# Patient Record
(demographics unavailable — no encounter records)

---

## 2025-01-29 NOTE — HISTORY OF PRESENT ILLNESS
[de-identified] : fever [FreeTextEntry6] : 3 yo M with PMH of eczema, otherwise healthy and growing appropriately, here for first visit.  1 week ago vomited once. Next day fever started and then had another episode of emesis following day. Was seen by a doctor with biologic mother and told he likely has norovirus. Went to  on 1/23/25 with fever when Foster Mother took over. Negative there for Flu and Covid. FLu panel result unknown still. Foster mother informed that dogs in previous home had giardia and coccidiosis so brought to [facility] on 1/27/25. Pt's symptoms did not match presentation expected for these parasites; no profuse large volume foul smelling diarrhea and child was well hydrated. No testing done in ER.   Presents today with continued fevers. Appetite down. Drinking well and wetting diapers. Foster Mom noticed pulling on ear and touching throat. Still with fevers, around 101 F. Given Tylenol at 8 AM this morning. Seems more tired as well. No significant coughing, runny nose, vomiting, diarrhea.

## 2025-01-29 NOTE — PHYSICAL EXAM
[Erythematous Oropharynx] : erythematous oropharynx [NL] : warm, clear [Enlarged Tonsils] : tonsils not enlarged [Exudate] : no exudate [Palate petechiae] : palate without petechiae

## 2025-01-29 NOTE — DISCUSSION/SUMMARY
Detail Level: Detailed [FreeTextEntry1] : 3 yo M with fevers continuing now for 6 days with not many other symptoms. Exam only significant for erythematous oropharynx and otherwise reassuring.  - rpt rapid flu neg. Limited RVP sent.  - rapid strep neg. throat cx sent.  Discussed plan with Foster Mother (Lyssa- [phone]) after extensive discussion: - will wait for RVP result - if RVP neg and no viral illness identified and still having fevers by Friday (7-8 days of fever at that point), will bring back into office to put bag and collect urine. At this time that is only bacterial potential source as bacteremia very unlikely in well appearing child.  - encourage use of motrin and tylenol as needed q4-6 hours and can alternate- reviewed proper doses for todays weight - provide adequate fluid intake to remain well hydrated i.e tea with honey, soups, smoothies, ice pops  Parent verbalized understanding and all questions addressed. Return to the office if symptoms worsen.

## 2025-02-05 NOTE — DISCUSSION/SUMMARY
[FreeTextEntry1] : 1 yo M here for hospital dc follow up of UTI, now almost finished wth Bactrim. Also with EPEC, Campylobacter and Adenovirus in the stool on GI PCR. Doing much better without fevers and stools solid. Plan to start  this week. Due to unknown allergy status, epi pens prescribed. Letters also written to allow him to bring own food to  and one clearing him to start as vaccinations UTD and healthy.   Foster Mother plans to schedule routine physical once she speaks with  and figures out when last physical was.

## 2025-02-05 NOTE — HISTORY OF PRESENT ILLNESS
[de-identified] : hospital follow up [FreeTextEntry6] : On 1/31/25 went back to the ER for continued fevers and worsening diarrhea. PO intake had decreased as well. Urinalysis done via bagged specimen and showed evidence of UTI and white count of 22. IV CTX given but  upon CTX finishing pt noted with body rash and benedryl given. Discharged home on Bactrim for 10 days for UTI.   In the interim, stool tests from our office returned positive for EPEC, Campylobacter and Adenovirus. Foster Mother made aware.  Here today doing much better. Afebrile for many days. Stools are now formed. He is very active at home and eating/drinking well. Allergies discussed with Foster Mother. Paperwork from prior Pediatrician has serum testing and positive allergy to eggs, milk, wheat and peanuts. Biological mother also mentioned allergy to tree nuts but no record of that. Plan is for [facility] to start  this week; however, cannot fill out physical form without physical appt. Foster Mother advised to find out when last physical was from .

## 2025-02-06 NOTE — HISTORY OF PRESENT ILLNESS
[Fruit] : fruit [Vegetables] : vegetables [Meat] : meat [Table food] : table food [___ stools per day] : [unfilled]  stools per day [Normal] : Normal [In bed] : In bed [Brushing teeth] : Brushing teeth [Toothpaste] : Primary Fluoride Source: Toothpaste [Playtime 60 min a day] : Playtime 60 min a day [No] : Not at  exposure [<2 hrs of screen time] : Less than 2 hrs of screen time [Water heater temperature set at <120 degrees F] : Water heater temperature set at <120 degrees F [Car seat in back seat] : Car seat in back seat [Smoke Detectors] : Smoke detectors [Carbon Monoxide Detectors] : Carbon monoxide detectors [NO] : No [Exposure to electronic nicotine delivery system] : No exposure to electronic nicotine delivery system [At risk for exposure to TB] : Not at risk for exposure to Tuberculosis [de-identified] : Foster Mother [de-identified] : Gluten free bread, sunflower seed butter, coconut yogurt [FreeTextEntry3] : 11 hours/night [FreeTextEntry9] : Will be starting  tomorrow  [de-identified] : missing second Hep A

## 2025-02-06 NOTE — PHYSICAL EXAM
Hide Additional Notes?: No [Alert] : alert [No Acute Distress] : no acute distress [Normocephalic] : normocephalic [Anterior [place] Closed] : anterior fontanelle closed [Red Reflex Bilateral] : red reflex bilateral [PERRL] : PERRL [Normally Placed Ears] : normally placed ears [Auricles Well Formed] : auricles well formed [Clear Tympanic membranes with present light reflex and bony landmarks] : clear tympanic membranes with present light reflex and bony landmarks [No Discharge] : no discharge [Nares Patent] : nares patent [Palate Intact] : palate intact [Uvula Midline] : uvula midline [Tooth Eruption] : tooth eruption  [Supple, full passive range of motion] : supple, full passive range of motion [No Palpable Masses] : no palpable masses [Symmetric Chest Rise] : symmetric chest rise [Clear to Auscultation Bilaterally] : clear to auscultation bilaterally [Regular Rate and Rhythm] : regular rate and rhythm [S1, S2 present] : S1, S2 present [No Murmurs] : no murmurs [+2 Femoral Pulses] : +2 femoral pulses [Soft] : soft [NonTender] : non tender [Non Distended] : non distended [Normoactive Bowel Sounds] : normoactive bowel sounds [No Hepatomegaly] : no hepatomegaly [No Splenomegaly] : no splenomegaly [Central Urethral Opening] : central urethral opening [Testicles Descended Bilaterally] : testicles descended bilaterally [Patent] : patent [Normally Placed] : normally placed [No Abnormal Lymph Nodes Palpated] : no abnormal lymph nodes palpated [No Clavicular Crepitus] : no clavicular crepitus [Symmetric Buttocks Creases] : symmetric buttocks creases [No Spinal Dimple] : no spinal dimple [NoTuft of Hair] : no tuft of hair [Cranial Nerves Grossly Intact] : cranial nerves grossly intact [No Rash or Lesions] : no rash or lesions

## 2025-02-06 NOTE — HISTORY OF PRESENT ILLNESS
[Fruit] : fruit [Vegetables] : vegetables [Meat] : meat [Table food] : table food [___ stools per day] : [unfilled]  stools per day [Normal] : Normal [In bed] : In bed [Brushing teeth] : Brushing teeth [Toothpaste] : Primary Fluoride Source: Toothpaste [Playtime 60 min a day] : Playtime 60 min a day [No] : Not at  exposure [<2 hrs of screen time] : Less than 2 hrs of screen time [Water heater temperature set at <120 degrees F] : Water heater temperature set at <120 degrees F [Car seat in back seat] : Car seat in back seat [Smoke Detectors] : Smoke detectors [Carbon Monoxide Detectors] : Carbon monoxide detectors [NO] : No [Exposure to electronic nicotine delivery system] : No exposure to electronic nicotine delivery system [At risk for exposure to TB] : Not at risk for exposure to Tuberculosis [de-identified] : Foster Mother [de-identified] : Gluten free bread, sunflower seed butter, coconut yogurt [FreeTextEntry3] : 11 hours/night [FreeTextEntry9] : Will be starting  tomorrow  [de-identified] : missing second Hep A

## 2025-02-06 NOTE — PHYSICAL EXAM
[Alert] : alert [No Acute Distress] : no acute distress [Normocephalic] : normocephalic [Anterior [place] Closed] : anterior fontanelle closed [Red Reflex Bilateral] : red reflex bilateral [PERRL] : PERRL [Normally Placed Ears] : normally placed ears [Auricles Well Formed] : auricles well formed [Clear Tympanic membranes with present light reflex and bony landmarks] : clear tympanic membranes with present light reflex and bony landmarks [No Discharge] : no discharge [Nares Patent] : nares patent [Palate Intact] : palate intact [Uvula Midline] : uvula midline [Tooth Eruption] : tooth eruption  [Supple, full passive range of motion] : supple, full passive range of motion [No Palpable Masses] : no palpable masses [Symmetric Chest Rise] : symmetric chest rise [Clear to Auscultation Bilaterally] : clear to auscultation bilaterally [Regular Rate and Rhythm] : regular rate and rhythm [S1, S2 present] : S1, S2 present [No Murmurs] : no murmurs [+2 Femoral Pulses] : +2 femoral pulses [Soft] : soft [NonTender] : non tender [Non Distended] : non distended [Normoactive Bowel Sounds] : normoactive bowel sounds [No Hepatomegaly] : no hepatomegaly [No Splenomegaly] : no splenomegaly [Central Urethral Opening] : central urethral opening [Testicles Descended Bilaterally] : testicles descended bilaterally [Patent] : patent [Normally Placed] : normally placed [No Abnormal Lymph Nodes Palpated] : no abnormal lymph nodes palpated [No Clavicular Crepitus] : no clavicular crepitus [Symmetric Buttocks Creases] : symmetric buttocks creases [No Spinal Dimple] : no spinal dimple [NoTuft of Hair] : no tuft of hair [Cranial Nerves Grossly Intact] : cranial nerves grossly intact [No Rash or Lesions] : no rash or lesions

## 2025-02-06 NOTE — DEVELOPMENTAL MILESTONES
[Plays alongside other children] : plays alongside other children [Takes off some clothing] : takes off some clothing [Scoops well with spoon] : scoops well with spoon [Uses 50 words] : uses 50 words [Combine 2 words into phrase or] : combines 2 words into phrase or sentences [Follows 2-step command] : follows 2-step command [Uses words that are 50% intelligible] : uses words that are 50% intelligible to strangers [Kicks ball] : kicks ball  [Jumps off ground with 2 feet] : jumps off ground with 2 feet [Runs with coordination] : runs with coordination [Climbs up a ladder at a] : climbs up a ladder at a playground [Stacks objects] : stacks objects [Turns book pages] : turns book pages [Uses hands to turn objects] : uses hands to turn objects [Passed] : passed [Yes] : Completed. [FreeTextEntry1] : foster Mother unable to fill out completely

## 2025-02-06 NOTE — DISCUSSION/SUMMARY
[Normal Growth] : growth [Normal Development] : development [None] : No known medical problems [No Elimination Concerns] : elimination [No Feeding Concerns] : feeding [No Skin Concerns] : skin [Normal Sleep Pattern] : sleep [Assessment of Language Development] : assessment of language development [Temperament and Behavior] : temperament and behavior [Toilet Training] : toilet training [TV Viewing] : tv viewing [Safety] : safety [No Medications] : ~He/She~ is not on any medications [Parent/Guardian] : parent/guardian [FreeTextEntry1] : 2 year old patient here for well child visit. No concerns today with growth and development.   ** Allergist info given to confirm reported allergies. Epi pen prescribed for now to have at home and at . Foster Mother currently avoiding foods reported that he is allergic to.  Anticipatory guidance discussed: - Continue cow's milk. Continue table foods, 3 meals with 2-3 snacks per day. - Incorporate fluorinated water daily in a sippy cup. - Brush teeth twice a day with soft toothbrush. Recommend visit to dentist. - When in car, keep child in rear-facing car seats until age 2, or until the maximum height and weight for seat is reached. - Put toddler to sleep in own bed. - Help toddler to maintain consistent daily routines and sleep schedule. - Toilet training discussed. - Ensure home is safe- No cleaning supplies, medication bottles, knives, scissors, etc within reach of child - Use consistent, positive discipline. - Read aloud to toddler. - Limit screen time to no more than 2 hours per day.   Routine blood work to be performed- CBC and Lead Missing 2nd Hep A vaccination. Foster Mother does not have rights to approve vaccinations.    Go check vision passed.   Will return for next well child visit in 1 year.

## 2025-05-07 NOTE — PHYSICAL EXAM
[NL] : moves all extremities x4, warm, well perfused x4 [de-identified] : mild eczema left posterior upper thigh

## 2025-05-07 NOTE — DISCUSSION/SUMMARY
[FreeTextEntry1] : 1 yo male with allergies to egg, peanut and tree nuts.  Pt's weight and height are appropriate and he is gaining weight and growing appropriately.  I will obtain consultation from AI.  Avoid exposure to environmental allergens. Wash hands, hair and clothing after being outdoors. Air purifier.   Keep windows closed.  Recommend supportive care with oral long-acting antihistamine daily such as Claritin. Use nasal saline 2-3 times daily.  May wear glasses and a cap when outdoors if eyes affected.

## 2025-05-07 NOTE — HISTORY OF PRESENT ILLNESS
[de-identified] : weight concerns [FreeTextEntry6] : Concerns with allergies-Pt was seen by a Dr. Kristopher Hernandes an allergist 02/25 had blood testing and foster mother was told he has peanut, tree nut and egg allergies- those foods should be eliminated from his diet completely.  I was unable to find the consultation along with all the testing done by the allergist- I will contact his office to obtain.  After removing those foods his eczema improved tremendously.   Mother was told she could give small amounts of dairy and wheat-when she does she believes his eczema flares up and he has significant behavioral issues behavioral issues.  Milk effects his eczema, as per mom Gluten effects his behavior.  Allergist said to keep these foods in his diet.  Mother wants to bring him to a holistic/functional medicine doctor as she would prefer not to give him any medication for any allergy symptoms.  She believes he has seasonal allergies as well- when spending time outdoors his race gets red and rash and he sneezes and has a runny nose.  Foster mother would also like a letter with documentation of his height and weight along with his allergies.  [facility] for .

## 2025-05-07 NOTE — REVIEW OF SYSTEMS
[Nasal Discharge] : nasal discharge [Nasal Congestion] : nasal congestion [Rash] : rash [Dry Skin] : dry skin [Itching] : itching [Negative] : Genitourinary

## 2025-05-16 NOTE — HISTORY OF PRESENT ILLNESS
[de-identified] : Hospital f/u [FreeTextEntry6] : 1 y/o with hx of food allergies treenut, egg and peanuts seen by A&I last 2/2025 Dr Ady Marcano, DO [place] for [place] [facility] [place] with CPS  and both biological parents after breakout of hives.  CPS worker reports he was picking patient up for supervised visit with biological parents from foster parents and noticed hives on his stomach.  CPS worker admits foster mother fed patient hummus and kiwi and patient developed hives immediately approximately 5 PM. Both of these foods' patient has had in the past, unclear if there was cross-contamination. No lip or tongue swelling, difficulty breathing, vomiting, diarrhea. No meds given. Since then, has been acting himself.   SKIN: raised wheels left hip. dry scales noted to b/l legs, trunk. no signs of anaphylaxis at this time. allergic urticaria, recommended Benadryl prn. already has EPI pen.

## 2025-06-27 NOTE — DISCUSSION/SUMMARY
[FreeTextEntry1] : 1 y/o with hx of multiple food allergies Mother have had second opinion with allergist Dr. Joselyn Rose MD and with Dr Derek MEJIA as foster mother believes patients' allergies are worsened by milk and Gluten but needs a letter from physicians stating it for biological parents not to give gluten and dairy unfortunately the biological parents declined any blood work evaluation and for now unclear if patient if gluten allergy and dairy allergy  We will get in touch with both offices and after reviewing the information if deem will write letter requesting the biological parents to allow blood work to clarify the allergy and the need of avoidance the next visit with biological parents it is on Tuesday  All questions answered Foster mother verbalized understanding

## 2025-06-27 NOTE — HISTORY OF PRESENT ILLNESS
[de-identified] : hives [FreeTextEntry6] : 2 years with hx of food allergies on foster care  Foster care mother brought him here as patient developed hives at  after a supervised visit with the biological family after the biological parents offered Him past with jodi sauce for breakfast.  gave Benadryl and called foster mother to  [facility]

## 2025-07-08 NOTE — DISCUSSION/SUMMARY
[FreeTextEntry1] : 1 y/o with HFM plan: HFM is a viral infection. So, antibiotics don't work to treat the illness.  Drinking more fluids Taking acetaminophen for any fever Taking oral pain relievers, such as ice pops Eating a bland diet, such as cold milk and ice cream. Your child should stay away from acidic and spicy foods. Most children with the illness feel better in about a week. It's important that your child drinks enough fluids to prevent getting dehydrated.

## 2025-07-08 NOTE — PHYSICAL EXAM
[Vesicles] : vesicles present [Ulcerative Lesions] : ulcerative lesions [NL] : moves all extremities x4, warm, well perfused x4 [Papulovesicular eruption] : papulovesicular eruption [Hands] : hands [Feet] : feet

## 2025-07-08 NOTE — DISCUSSION/SUMMARY
[FreeTextEntry1] : 3 y/o with HFM plan: HFM is a viral infection. So, antibiotics don't work to treat the illness.  Drinking more fluids Taking acetaminophen for any fever Taking oral pain relievers, such as ice pops Eating a bland diet, such as cold milk and ice cream. Your child should stay away from acidic and spicy foods. Most children with the illness feel better in about a week. It's important that your child drinks enough fluids to prevent getting dehydrated.

## 2025-07-08 NOTE — HISTORY OF PRESENT ILLNESS
[Derm Symptoms] : DERM SYMPTOMS [Rash] : rash [Extremities] : extremities [Diaper area] : diaper area [___ Day(s)] : [unfilled] day(s) [Constant] : constant [New Formula] : no new formula [New Food] : no new food [New Clothing] : no new clothing [New Skin Products] : no new skin products [New Diapers] : no new diapers [Recent Antibiotic Use] : no recent antibiotic use [Hx of recent COVID-19 infection] : no history of recent COVID-19 infection [Sick Contacts: ___] : no sick contacts [Spreading] : spreading [Fever] : fever [Reducted Appetite] : no reduced appetite [URI Symptoms] : no URI symptoms [Lip Swelling] : no lip swelling [Vomiting] : no vomiting [Discharge from affected areas] : no discharge from affected areas [Pruritus] : no pruritus [Diarrhea] : no diarrhea [Bleeding from affected areas] : no bleeding from affected areas [Stable] : stable